# Patient Record
Sex: FEMALE | Race: WHITE | Employment: UNEMPLOYED | URBAN - METROPOLITAN AREA
[De-identification: names, ages, dates, MRNs, and addresses within clinical notes are randomized per-mention and may not be internally consistent; named-entity substitution may affect disease eponyms.]

---

## 2019-02-05 ENCOUNTER — TELEPHONE (OUTPATIENT)
Dept: PHARMACY | Facility: CLINIC | Age: 54
End: 2019-02-05

## 2019-02-05 NOTE — TELEPHONE ENCOUNTER
Patient called requesting an appointment with me.  She was out of insurance for a while, and is now hoping to get reestablished with health care.  Has questions about medications, but also needs annual exam & establishing care.    Will ask our scheduling staff to schedule patient to establish with primary care and me for MTM on the same day.    Dana Loomis, Pharm.D., BCPS

## 2019-02-14 ENCOUNTER — OFFICE VISIT (OUTPATIENT)
Dept: PHARMACY | Facility: CLINIC | Age: 54
End: 2019-02-14
Payer: COMMERCIAL

## 2019-02-14 VITALS
SYSTOLIC BLOOD PRESSURE: 124 MMHG | HEART RATE: 82 BPM | HEIGHT: 66 IN | WEIGHT: 156.7 LBS | BODY MASS INDEX: 25.18 KG/M2 | DIASTOLIC BLOOD PRESSURE: 81 MMHG

## 2019-02-14 DIAGNOSIS — R63.5 WEIGHT GAIN: ICD-10-CM

## 2019-02-14 PROCEDURE — 99605 MTMS BY PHARM NP 15 MIN: CPT | Performed by: PHARMACIST

## 2019-02-14 PROCEDURE — 99607 MTMS BY PHARM ADDL 15 MIN: CPT | Performed by: PHARMACIST

## 2019-02-14 ASSESSMENT — ANXIETY QUESTIONNAIRES
GAD7 TOTAL SCORE: 10
5. BEING SO RESTLESS THAT IT IS HARD TO SIT STILL: MORE THAN HALF THE DAYS
3. WORRYING TOO MUCH ABOUT DIFFERENT THINGS: SEVERAL DAYS
7. FEELING AFRAID AS IF SOMETHING AWFUL MIGHT HAPPEN: MORE THAN HALF THE DAYS
2. NOT BEING ABLE TO STOP OR CONTROL WORRYING: SEVERAL DAYS
6. BECOMING EASILY ANNOYED OR IRRITABLE: MORE THAN HALF THE DAYS
1. FEELING NERVOUS, ANXIOUS, OR ON EDGE: NOT AT ALL

## 2019-02-14 ASSESSMENT — PATIENT HEALTH QUESTIONNAIRE - PHQ9
5. POOR APPETITE OR OVEREATING: MORE THAN HALF THE DAYS
SUM OF ALL RESPONSES TO PHQ QUESTIONS 1-9: 5

## 2019-02-14 ASSESSMENT — MIFFLIN-ST. JEOR: SCORE: 1337.54

## 2019-02-14 NOTE — PROGRESS NOTES
"SUBJECTIVE/OBJECTIVE:                           Tony Myrick is a 53 year old female coming in for an initial visit for Medication Therapy Management.  She was referred to me from a friend and will be establishing care with Dr. Monsalve tomorrow.    Chief Complaint: weight gain.  Personal Healthcare Goals: wants to avoid significant weight gain she has seen in her sisters    Allergies/ADRs: Reviewed in Epic  Tobacco: No tobacco use  Alcohol: 1-3 beverages / week    PMH: Reviewed in Epic    Medication Adherence/Access:  no issues reported    Please note, patient was supposed to have back-to-back visits with myself and Dr. Monsalve to establish care, but these were scheduled on separate days -- she is scheduled to see Dr. Monsalve tomorrow.      Weight gain: Has gained ~ 30 pounds in less than months;  Is fearful she's developing food addiction behavior.  Has cravings for cake, sweets, alcohol (1-2 glasses / week)  Has been treated for meth & cocaine addiction; sober x 9 years;  Now is a  at [employer]    After treatment she was on naltrexone, bupropion, fluoxetine (up to 40mg);  Probably off for 4 years now; Lost insurance for a while and managed to cope off of medications before getting her current job and back on insurance.   I noted a history of provigil in her chart from 2005, but patient does not recall being on this (may have been while she was using meth/cocaine).   After the visit, I also noted a h/o of being prescribed citalopram.     Family history of obesity (sister is \"400 lbs\");  All of her sisters gained significant weight later in life and she is afraid this will happen to her.     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  She does have a therapist she sees at work, but it's not clear if this is something they've discussed.    Is interested in Contrave;  Has a friend who " "has had good success on this medication.    Supplements: Is currently on a multivitamin daily    Today's Vitals: /81   Pulse 82   Ht 5' 6\" (1.676 m)   Wt 156 lb 11.2 oz (71.1 kg)   BMI 25.29 kg/m        ASSESSMENT:                             Current medications were reviewed today.     Medication Adherence: good, no issues identified    Weight gain: Rapid weight gain is concerning.  Patient is interested in anti-obesity medications, but with a BMI of 25.29, she doesn't meet qualifications for obesity treatment.  Her MIGUEL was elevated today.  She may benefit from adding back bupropion to help with food cravings and anxiety symptoms.  Needs to establish primary care.     Supplements:  stable    PLAN:                            1.  Establish care with Dr. Monsalve at visit tomorrow.  2.  Pending evaluation from Dr. Monsalve, consider restarting bupropion SR, starting at 150mg daily for 3 days, then increasing to 150mg BID.      I spent 45 minutes with this patient today.  A copy of the visit note was provided to the patient's new provider, Dr. Monsalve    Will follow up in 3 months.    The patient was given a summary of these recommendations as an after visit summary.         Dana Loomis, Pharm.D., BCPS    "

## 2019-02-15 ASSESSMENT — ANXIETY QUESTIONNAIRES: GAD7 TOTAL SCORE: 10

## 2021-11-05 NOTE — PATIENT INSTRUCTIONS
"Recommendations from today's MTM visit:                                                    MTM (medication therapy management) is a service provided by a clinical pharmacist designed to help you get the most of out of your medicines.     1. Based on your current BMI, you are in the \"overweight\" but not \"obese\" category.  As you have self-recognized, your current eating patterns and cravings are likely to contribute to ongoing weight gain.      I will touch base with Dr. Monsalve prior to your visit with her tomorrow.  We will consider treatment with medication (such as Contrave) or she may also consider referral to our weight management program.      2. I do believe that adding bupropion alone for it's antidepressant effects and assistance with cravings may be very effective for you, and is a good option.      Next MTM visit: 3- 6months     To schedule another MTM appointment, please call the clinic directly or you may call the MTM scheduling line at [phone] or toll-free at [phone].     My Clinical Pharmacist's contact information:                                                      It was a pleasure talking with you today!  Please feel free to contact me with any questions or concerns you have.      Dana Loomis, Pharm.D., [facility]  Phone:  [phone]      You may receive a survey about the MTM services you received.  I would appreciate your feedback to help me serve you better in the future. Please fill it out and return it when you can. Your comments will be anonymous.          " none